# Patient Record
Sex: FEMALE | Race: BLACK OR AFRICAN AMERICAN | NOT HISPANIC OR LATINO | Employment: FULL TIME | URBAN - METROPOLITAN AREA
[De-identification: names, ages, dates, MRNs, and addresses within clinical notes are randomized per-mention and may not be internally consistent; named-entity substitution may affect disease eponyms.]

---

## 2024-08-12 ENCOUNTER — HOSPITAL ENCOUNTER (EMERGENCY)
Facility: HOSPITAL | Age: 29
Discharge: HOME | End: 2024-08-12
Attending: EMERGENCY MEDICINE
Payer: MEDICAID

## 2024-08-12 VITALS
HEART RATE: 80 BPM | BODY MASS INDEX: 46.33 KG/M2 | TEMPERATURE: 97 F | OXYGEN SATURATION: 96 % | WEIGHT: 236 LBS | DIASTOLIC BLOOD PRESSURE: 116 MMHG | SYSTOLIC BLOOD PRESSURE: 232 MMHG | RESPIRATION RATE: 20 BRPM | HEIGHT: 60 IN

## 2024-08-12 DIAGNOSIS — D50.9 MICROCYTIC ANEMIA: ICD-10-CM

## 2024-08-12 DIAGNOSIS — R10.13 EPIGASTRIC PAIN: ICD-10-CM

## 2024-08-12 DIAGNOSIS — K62.5 RECTAL BLEEDING: Primary | ICD-10-CM

## 2024-08-12 LAB
ABO + RH BLD: NORMAL
ALBUMIN SERPL BCP-MCNC: 3.7 G/DL (ref 3.5–5.2)
ALP SERPL-CCNC: 72 U/L (ref 55–135)
ALT SERPL W/O P-5'-P-CCNC: 8 U/L (ref 10–44)
ANION GAP SERPL CALC-SCNC: 9 MMOL/L (ref 8–16)
APTT PPP: 30 SEC (ref 21–32)
AST SERPL-CCNC: 12 U/L (ref 10–40)
B-HCG UR QL: NEGATIVE
BASOPHILS # BLD AUTO: 0.02 K/UL (ref 0–0.2)
BASOPHILS NFR BLD: 0.4 % (ref 0–1.9)
BILIRUB SERPL-MCNC: 0.1 MG/DL (ref 0.1–1)
BILIRUB UR QL STRIP: NEGATIVE
BLD GP AB SCN CELLS X3 SERPL QL: NORMAL
BUN SERPL-MCNC: 8 MG/DL (ref 6–20)
CALCIUM SERPL-MCNC: 9 MG/DL (ref 8.7–10.5)
CHLORIDE SERPL-SCNC: 107 MMOL/L (ref 95–110)
CLARITY UR: CLEAR
CO2 SERPL-SCNC: 24 MMOL/L (ref 23–29)
COLOR UR: YELLOW
CREAT SERPL-MCNC: 0.8 MG/DL (ref 0.5–1.4)
DIFFERENTIAL METHOD BLD: ABNORMAL
EOSINOPHIL # BLD AUTO: 0.1 K/UL (ref 0–0.5)
EOSINOPHIL NFR BLD: 1.5 % (ref 0–8)
ERYTHROCYTE [DISTWIDTH] IN BLOOD BY AUTOMATED COUNT: 17.1 % (ref 11.5–14.5)
EST. GFR  (NO RACE VARIABLE): >60 ML/MIN/1.73 M^2
GLUCOSE SERPL-MCNC: 103 MG/DL (ref 70–110)
GLUCOSE UR QL STRIP: NEGATIVE
HCT VFR BLD AUTO: 31.2 % (ref 37–48.5)
HGB BLD-MCNC: 9.4 G/DL (ref 12–16)
HGB UR QL STRIP: ABNORMAL
IMM GRANULOCYTES # BLD AUTO: 0.01 K/UL (ref 0–0.04)
IMM GRANULOCYTES NFR BLD AUTO: 0.2 % (ref 0–0.5)
INR PPP: 1 (ref 0.8–1.2)
KETONES UR QL STRIP: NEGATIVE
LEUKOCYTE ESTERASE UR QL STRIP: NEGATIVE
LIPASE SERPL-CCNC: 46 U/L (ref 4–60)
LYMPHOCYTES # BLD AUTO: 2.7 K/UL (ref 1–4.8)
LYMPHOCYTES NFR BLD: 49.7 % (ref 18–48)
MAGNESIUM SERPL-MCNC: 1.8 MG/DL (ref 1.6–2.6)
MCH RBC QN AUTO: 22 PG (ref 27–31)
MCHC RBC AUTO-ENTMCNC: 30.1 G/DL (ref 32–36)
MCV RBC AUTO: 73 FL (ref 82–98)
MICROSCOPIC COMMENT: NORMAL
MONOCYTES # BLD AUTO: 0.6 K/UL (ref 0.3–1)
MONOCYTES NFR BLD: 10.2 % (ref 4–15)
NEUTROPHILS # BLD AUTO: 2.1 K/UL (ref 1.8–7.7)
NEUTROPHILS NFR BLD: 38 % (ref 38–73)
NITRITE UR QL STRIP: NEGATIVE
NRBC BLD-RTO: 0 /100 WBC
PH UR STRIP: 6 [PH] (ref 5–8)
PLATELET # BLD AUTO: 370 K/UL (ref 150–450)
PMV BLD AUTO: 9.9 FL (ref 9.2–12.9)
POTASSIUM SERPL-SCNC: 3.6 MMOL/L (ref 3.5–5.1)
PROT SERPL-MCNC: 7.9 G/DL (ref 6–8.4)
PROT UR QL STRIP: ABNORMAL
PROTHROMBIN TIME: 10.5 SEC (ref 9–12.5)
RBC # BLD AUTO: 4.27 M/UL (ref 4–5.4)
RBC #/AREA URNS HPF: 0 /HPF (ref 0–4)
SODIUM SERPL-SCNC: 140 MMOL/L (ref 136–145)
SP GR UR STRIP: >1.03 (ref 1–1.03)
SPECIMEN OUTDATE: NORMAL
URN SPEC COLLECT METH UR: ABNORMAL
UROBILINOGEN UR STRIP-ACNC: ABNORMAL EU/DL
WBC # BLD AUTO: 5.41 K/UL (ref 3.9–12.7)

## 2024-08-12 PROCEDURE — 85025 COMPLETE CBC W/AUTO DIFF WBC: CPT

## 2024-08-12 PROCEDURE — 86900 BLOOD TYPING SEROLOGIC ABO: CPT | Performed by: EMERGENCY MEDICINE

## 2024-08-12 PROCEDURE — 25500020 PHARM REV CODE 255: Performed by: EMERGENCY MEDICINE

## 2024-08-12 PROCEDURE — 86901 BLOOD TYPING SEROLOGIC RH(D): CPT | Performed by: EMERGENCY MEDICINE

## 2024-08-12 PROCEDURE — 85730 THROMBOPLASTIN TIME PARTIAL: CPT | Performed by: EMERGENCY MEDICINE

## 2024-08-12 PROCEDURE — 81000 URINALYSIS NONAUTO W/SCOPE: CPT

## 2024-08-12 PROCEDURE — 99285 EMERGENCY DEPT VISIT HI MDM: CPT | Mod: 25

## 2024-08-12 PROCEDURE — 86850 RBC ANTIBODY SCREEN: CPT | Performed by: EMERGENCY MEDICINE

## 2024-08-12 PROCEDURE — 83735 ASSAY OF MAGNESIUM: CPT

## 2024-08-12 PROCEDURE — 85610 PROTHROMBIN TIME: CPT | Performed by: EMERGENCY MEDICINE

## 2024-08-12 PROCEDURE — 93005 ELECTROCARDIOGRAM TRACING: CPT

## 2024-08-12 PROCEDURE — 80053 COMPREHEN METABOLIC PANEL: CPT

## 2024-08-12 PROCEDURE — 81025 URINE PREGNANCY TEST: CPT

## 2024-08-12 PROCEDURE — 93010 ELECTROCARDIOGRAM REPORT: CPT | Mod: ,,, | Performed by: INTERNAL MEDICINE

## 2024-08-12 PROCEDURE — 83690 ASSAY OF LIPASE: CPT | Performed by: EMERGENCY MEDICINE

## 2024-08-12 RX ORDER — KETOROLAC TROMETHAMINE 30 MG/ML
15 INJECTION, SOLUTION INTRAMUSCULAR; INTRAVENOUS
Status: DISCONTINUED | OUTPATIENT
Start: 2024-08-12 | End: 2024-08-12

## 2024-08-12 RX ADMIN — IOHEXOL 130 ML: 350 INJECTION, SOLUTION INTRAVENOUS at 10:08

## 2024-08-13 LAB
OHS QRS DURATION: 86 MS
OHS QTC CALCULATION: 449 MS

## 2024-08-13 NOTE — ED TRIAGE NOTES
"30 yo female reports to ed with co blood in stool. States this has happened before but has become more consistent. Reports seeing blood clots in stool. States her ABD "spasms like a thien horse" and changes from one side to another. States symptoms come and go. Also reports intermittent "stabbing" sensation in legs periodically. Denies blood thinner use, does not take any daily medications.   "

## 2024-08-13 NOTE — ED NOTES
"Pt states she reported to the ER because she was experiencing "blood in stools" and pain to her lower abd and groin. Pt states she  does not feel pain at this moment but when touched she feels "pressure" Pt states she has been experiencing these symptoms for about 3 days now. Redig and pillow provided for comfort. Education provided. Safety measures intact. Side rails x2. Call bell in reach.   "

## 2024-08-13 NOTE — ED NOTES
Pt AAOx3 siting upright in bed on mobile device. Respirations even and unlabored. No signs of distress. Pt denies needs at this time. Side rails x2. Bed in lowest position. Call bell in reach. Education provided. Safety measures intact.

## 2024-08-13 NOTE — ED PROVIDER NOTES
Encounter Date: 8/12/2024       History     Chief Complaint   Patient presents with    Groin Pain     PT to right groin pain with intermittent spasm type pain to RUQ ABD. PT endorses dark blood in stools with clots on last Friday and dark tarry stools since.      Patient presents with multiple complaints.  She has complaints of intermittent pain to the right groin area along with intermittent pain to the bilateral upper quadrant area for the past few days.  She is also complaining of hematochezia with a clots.  Denies any NSAID use, denies any fevers/chills.  States nothing exacerbates or relieves the issues.  States that she is currently not having groin pain.    The history is provided by the patient.     Review of patient's allergies indicates:  No Known Allergies  History reviewed. No pertinent past medical history.  No past surgical history on file.  No family history on file.  Social History     Tobacco Use    Smoking status: Former    Smokeless tobacco: Never   Substance Use Topics    Alcohol use: No    Drug use: Never     Review of Systems   Gastrointestinal:  Positive for abdominal pain and blood in stool.       Physical Exam     Initial Vitals [08/12/24 1927]   BP Pulse Resp Temp SpO2   (!) 232/116 80 20 97.1 °F (36.2 °C) 96 %      MAP       --         Physical Exam    Vitals reviewed.  Constitutional: She appears well-developed. No distress.   Abdominal: Abdomen is soft.   Right lower quadrant tenderness to palpation, rectal exam shows brown stool with no signs of blood   Musculoskeletal:         General: Normal range of motion.     Neurological: She is alert and oriented to person, place, and time.   Skin: Skin is warm and dry. No rash noted.         ED Course   Procedures  Labs Reviewed   CBC W/ AUTO DIFFERENTIAL - Abnormal       Result Value    WBC 5.41      RBC 4.27      Hemoglobin 9.4 (*)     Hematocrit 31.2 (*)     MCV 73 (*)     MCH 22.0 (*)     MCHC 30.1 (*)     RDW 17.1 (*)     Platelets 370       MPV 9.9      Immature Granulocytes 0.2      Gran # (ANC) 2.1      Immature Grans (Abs) 0.01      Lymph # 2.7      Mono # 0.6      Eos # 0.1      Baso # 0.02      nRBC 0      Gran % 38.0      Lymph % 49.7 (*)     Mono % 10.2      Eosinophil % 1.5      Basophil % 0.4      Differential Method Automated     COMPREHENSIVE METABOLIC PANEL - Abnormal    Sodium 140      Potassium 3.6      Chloride 107      CO2 24      Glucose 103      BUN 8      Creatinine 0.8      Calcium 9.0      Total Protein 7.9      Albumin 3.7      Total Bilirubin 0.1      Alkaline Phosphatase 72      AST 12      ALT 8 (*)     eGFR >60      Anion Gap 9     URINALYSIS, REFLEX TO URINE CULTURE - Abnormal    Specimen UA Urine, Clean Catch      Color, UA Yellow      Appearance, UA Clear      pH, UA 6.0      Specific Gravity, UA >1.030 (*)     Protein, UA Trace (*)     Glucose, UA Negative      Ketones, UA Negative      Bilirubin (UA) Negative      Occult Blood UA 3+ (*)     Nitrite, UA Negative      Urobilinogen, UA 2.0-3.0 (*)     Leukocytes, UA Negative      Narrative:     Specimen Source->Urine   MAGNESIUM    Magnesium 1.8     URINALYSIS MICROSCOPIC    RBC, UA 0      Microscopic Comment SEE COMMENT      Narrative:     Specimen Source->Urine   PREGNANCY TEST, URINE RAPID   APTT    aPTT 30.0     PROTIME-INR    Prothrombin Time 10.5      INR 1.0     PREGNANCY TEST, URINE RAPID    Preg Test, Ur Negative      Narrative:     Specimen Source->Urine   LIPASE    Lipase 46     TYPE & SCREEN    Group & Rh O POS      Indirect [place] NEG      Specimen Outdate 08/15/2024 23:59       EKG Readings: (Independently Interpreted)   Sinus rhythm rate of 73, normal axis, no ST or T-wave abnormalities noted     ECG Results              EKG 12-lead (Final result)        Collection Time Result Time QRS Duration OHS QTC Calculation    08/12/24 21:17:25 08/13/24 17:14:44 86 449                     Final result by Interface, Lab In [facility] (08/13/24 17:14:48)                    Narrative:    Test Reason : R10.13,    Vent. Rate : 073 BPM     Atrial Rate : 073 BPM     P-R Int : 126 ms          QRS Dur : 086 ms      QT Int : 408 ms       P-R-T Axes : 013 057 021 degrees     QTc Int : 449 ms    Normal sinus rhythm  Normal ECG  No previous ECGs available  Confirmed by Prabhakar Waters MD ([id number]) on 8/13/2024 5:14:42 PM    Referred By: [email or web address]   SELF           Confirmed By:Prabhakar Waters MD                                  Imaging Results              CTA Acute GI [place], Abdomen and Pelvis (Final result)  Result time 08/12/24 23:07:35      Final result by Dhaval Mathews MD (08/12/24 23:07:35)                   Impression:      No extravasation of contrast to suggest acute GI bleed at the time of the examination.    Normal appendix.    Trace free fluid in the pelvis, nonspecific, possibly physiological.    Additional incidental findings as above.      Electronically signed by: Dhaval Mathews MD  Date:    08/12/2024  Time:    23:07               Narrative:    EXAMINATION:  CTA ACUTE GI BLEED, ABDOMEN AND PELVIS    CLINICAL HISTORY:  rectal bleeding and RLQ and R pelvic pain;    TECHNIQUE:  Initial noncontrast  images were obtained of the abdomen and pelvis.  CT axial angiography images were then obtained from the lung bases to the pubic symphysis following the intravenous administration of 130 of Omnipaque 350with delayed images obtained per GI bleeding protocol.  Sagittal and coronal reformats were provided.    COMPARISON:  None    FINDINGS:  Heart: Normal in size.  No pericardial effusion.    Lung bases: Symmetrically expanded.  No consolidation, pleural effusion, mass, or pneumothorax.    Liver: Normal in size and attenuation without focal hepatic abnormality.    Gallbladder: Normal in appearance without evidence for cholecystitis.    Bile Ducts: No intra or extrahepatic biliary ductal dilation.    Pancreas: No pancreatic mass lesion or peripancreatic inflammatory  change.    Spleen: Unremarkable.    Adrenals: Unremarkable.    Kidneys/ Ureters: Normal in size and location.  Kidneys enhance and excrete normally.  No focal renal abnormality or nephrolithiasis.  No hydroureteronephrosis.    Bladder: Smooth contours without bladder wall thickening.    Reproductive organs: Unremarkable.    GI Tract/Mesentery: The stomach is unremarkable.  Visualized loops of small and large bowel are normal in caliber without evidence for obstruction or inflammation. Normal appendix.    Peritoneal Space: No intraperitoneal free air or significant adenopathy.  Small volume free fluid in the pelvis.    Retroperitoneum: No significant adenopathy.    Abdominal wall/extraperitoneal soft tissues: Small fat containing umbilical hernia.  Small fat containing infraumbilical ventral abdominal wall hernia just to the right of midline.    Vasculature: Abdominal aorta is normal in caliber, contour, and course without calcific atherosclerosis.  Major branch vessels are patent.  No extravasation of contrast to suggest acute GI bleed at time of scan.    Bones: Degenerative changes without acute fracture or bone destructive process.                                       Medications   iohexoL (OMNIPAQUE 350) injection 130 mL (130 mLs Intravenous Given 8/12/24 2235)     Medical Decision Making  Patient tolerating fluids at the time of discharge.  GI referral placed.  Instructed to return immediately for any new or worsening symptoms and she verbalized understanding.    Amount and/or Complexity of Data Reviewed  Labs: ordered. Decision-making details documented in ED Course.  Radiology: ordered. Decision-making details documented in ED Course.    Risk  Prescription drug management.  Risk Details: Differential diagnosis includes but is not limited to:  Appendicitis, ovarian cyst, ovarian torsion, GI bleed               ED Course as of 08/14/24 1003   Mon Aug 12, 2024   1933 BP(!): 232/116  Patient states that she has  high blood pressure but does not take her medication. [BJ]   2103 Comprehensive metabolic panel(!)  Nonspecific findings [CD]   2103 Magnesium  wnl [CD]   2104 Pregnancy, urine rapid  Negative [CD]   2221 Lipase  Within normal limits [CD]   2221 Protime-INR  Within normal limits [CD]   2221 APTT  Within normal limits [CD]   2316 CTA Acute GI [place], Abdomen and Pelvis  No extravasation of contrast to suggest acute GI bleed at the time of the examination.     Normal appendix.     Trace free fluid in the pelvis, nonspecific, possibly physiological.   [CD]      ED Course User Index  [BJ] Jaqueline Oh PA-C  [CD] Fredi Burt,                            Clinical Impression:  Final diagnoses:  [R10.13] Epigastric pain  [K62.5] Rectal bleeding (Primary)  [D50.9] Microcytic anemia          ED Disposition Condition    Discharge Stable          ED Prescriptions    None       Follow-up Information       Follow up With Specialties Details Why Contact Info Additional Information    [facility]   [address], [address]  [facility] [address] [phone] Please park in [address] or D and use [address] entrance. Take Medical Office Bldg. elevators.    Akira Browning MD Gastroenterology Schedule an appointment as soon as possible for a visit   [address] [place] [address]  [address] [place] [address] [id number] [phone]                Fredi Burt,   08/14/24 8202

## 2024-08-14 ENCOUNTER — TELEPHONE (OUTPATIENT)
Dept: HEMATOLOGY/ONCOLOGY | Facility: CLINIC | Age: 29
End: 2024-08-14
Payer: MEDICAID

## 2024-08-14 ENCOUNTER — OFFICE VISIT (OUTPATIENT)
Dept: GASTROENTEROLOGY | Facility: CLINIC | Age: 29
End: 2024-08-14
Payer: MEDICAID

## 2024-08-14 VITALS
BODY MASS INDEX: 46.92 KG/M2 | SYSTOLIC BLOOD PRESSURE: 154 MMHG | HEART RATE: 86 BPM | HEIGHT: 60 IN | WEIGHT: 239 LBS | DIASTOLIC BLOOD PRESSURE: 103 MMHG

## 2024-08-14 DIAGNOSIS — E66.01 CLASS 3 SEVERE OBESITY DUE TO EXCESS CALORIES WITH BODY MASS INDEX (BMI) OF 45.0 TO 49.9 IN ADULT, UNSPECIFIED WHETHER SERIOUS COMORBIDITY PRESENT: ICD-10-CM

## 2024-08-14 DIAGNOSIS — D50.9 MICROCYTIC ANEMIA: ICD-10-CM

## 2024-08-14 DIAGNOSIS — K59.04 CHRONIC IDIOPATHIC CONSTIPATION: Primary | ICD-10-CM

## 2024-08-14 DIAGNOSIS — R10.30 LOWER ABDOMINAL PAIN: ICD-10-CM

## 2024-08-14 DIAGNOSIS — K62.5 RECTAL BLEEDING: ICD-10-CM

## 2024-08-14 DIAGNOSIS — N92.0 MENORRHAGIA WITH REGULAR CYCLE: ICD-10-CM

## 2024-08-14 DIAGNOSIS — D50.0 IRON DEFICIENCY ANEMIA DUE TO CHRONIC BLOOD LOSS: Primary | ICD-10-CM

## 2024-08-14 PROBLEM — E66.813 CLASS 3 SEVERE OBESITY DUE TO EXCESS CALORIES WITH BODY MASS INDEX (BMI) OF 45.0 TO 49.9 IN ADULT: Status: ACTIVE | Noted: 2024-08-14

## 2024-08-14 PROCEDURE — 4010F ACE/ARB THERAPY RXD/TAKEN: CPT | Mod: CPTII,,, | Performed by: NURSE PRACTITIONER

## 2024-08-14 PROCEDURE — 99204 OFFICE O/P NEW MOD 45 MIN: CPT | Mod: S$PBB,,, | Performed by: NURSE PRACTITIONER

## 2024-08-14 PROCEDURE — 99215 OFFICE O/P EST HI 40 MIN: CPT | Mod: PBBFAC,PN | Performed by: NURSE PRACTITIONER

## 2024-08-14 PROCEDURE — 1160F RVW MEDS BY RX/DR IN RCRD: CPT | Mod: CPTII,,, | Performed by: NURSE PRACTITIONER

## 2024-08-14 PROCEDURE — 3008F BODY MASS INDEX DOCD: CPT | Mod: CPTII,,, | Performed by: NURSE PRACTITIONER

## 2024-08-14 PROCEDURE — 3080F DIAST BP >= 90 MM HG: CPT | Mod: CPTII,,, | Performed by: NURSE PRACTITIONER

## 2024-08-14 PROCEDURE — 99999 PR PBB SHADOW E&M-EST. PATIENT-LVL V: CPT | Mod: PBBFAC,,, | Performed by: NURSE PRACTITIONER

## 2024-08-14 PROCEDURE — 3077F SYST BP >= 140 MM HG: CPT | Mod: CPTII,,, | Performed by: NURSE PRACTITIONER

## 2024-08-14 PROCEDURE — 1159F MED LIST DOCD IN RCRD: CPT | Mod: CPTII,,, | Performed by: NURSE PRACTITIONER

## 2024-08-14 RX ORDER — DICYCLOMINE HYDROCHLORIDE 20 MG/1
20 TABLET ORAL 3 TIMES DAILY PRN
Qty: 60 TABLET | Refills: 2 | Status: SHIPPED | OUTPATIENT
Start: 2024-08-14

## 2024-08-14 RX ORDER — LISINOPRIL 10 MG/1
10 TABLET ORAL
COMMUNITY
Start: 2024-08-13

## 2024-08-14 RX ORDER — SODIUM, POTASSIUM,MAG SULFATES 17.5-3.13G
1 SOLUTION, RECONSTITUTED, ORAL ORAL DAILY
Qty: 1 KIT | Refills: 0 | Status: SHIPPED | OUTPATIENT
Start: 2024-08-14 | End: 2024-08-16

## 2024-08-14 RX ORDER — FERROUS SULFATE 325(65) MG
325 TABLET ORAL 2 TIMES DAILY
Qty: 60 TABLET | Refills: 2 | Status: SHIPPED | OUTPATIENT
Start: 2024-08-14

## 2024-08-14 RX ORDER — POLYETHYLENE GLYCOL 3350 17 G/17G
17 POWDER, FOR SOLUTION ORAL DAILY
Qty: 510 G | Refills: 11 | Status: SHIPPED | OUTPATIENT
Start: 2024-08-14

## 2024-08-14 NOTE — PROGRESS NOTES
Subjective:       Patient ID: Mariana Hardy is a 29 y.o. female.    Chief Complaint: Abdominal Pain, Rectal Bleeding, and Constipation    28 y/o female with HTN presents to clinic for hospital follow up with c/o abdominal pain and rectal bleeding. Patient was seen in ER 8/12 with aforementioned symptoms. CTA negative for acute GIB. Patient reports frequent rectal bleeding with BM. She is constipated and endorses straining to pass stool. Has BM 2-3x/week. Has never tried any stool softeners or laxatives. Intermittent lower abdominal sharp pains and cramping. Not related to food intake. Some relief with BM. No FH colon cancer. Recent labs consistent with microcytic anemia. Patient denies hx of MARCY. Does report heavy bleeding with menses. Has not seen GYN in years. She reports fatigue. No shortness of breath, chest pain, or dizziness.         History reviewed. No pertinent past medical history.    History reviewed. No pertinent surgical history.    Family History   Problem Relation Name Age of Onset    Thyroid disease Mother         Social History     Socioeconomic History    Marital status: Single   Tobacco Use    Smoking status: Former    Smokeless tobacco: Never   Substance and Sexual Activity    Alcohol use: No    Drug use: Never    Sexual activity: Not Currently       Review of Systems   Constitutional:  Positive for fatigue. Negative for appetite change and unexpected weight change.   HENT:  Negative for trouble swallowing.    Respiratory:  Negative for shortness of breath.    Cardiovascular:  Negative for chest pain.   Gastrointestinal:  Positive for abdominal pain, blood in stool and constipation.   Hematological:  Negative for adenopathy. Does not bruise/bleed easily.   Psychiatric/Behavioral:  Negative for dysphoric mood.          Objective:     Vitals:    08/14/24 1013   BP: (!) 154/103   BP Location: Left arm   Patient Position: Sitting   BP Method: Medium (Automatic)   Pulse: 86   Weight: 108.4 kg (238  lb 15.7 oz)   Height: 5' (1.524 m)          Physical Exam  Constitutional:       General: She is not in acute distress.     Appearance: She is obese.   HENT:      Head: Normocephalic.   Eyes:      Conjunctiva/sclera: Conjunctivae normal.   Pulmonary:      Effort: Pulmonary effort is normal. No respiratory distress.   Musculoskeletal:         General: Normal range of motion.      Cervical back: Normal range of motion.   Skin:     General: Skin is warm and dry.   Neurological:      Mental Status: She is alert and oriented to person, place, and time.   Psychiatric:         Mood and Affect: Mood normal.         Behavior: Behavior normal.               Assessment:         ICD-10-CM ICD-9-CM   1. Lower abdominal pain  R10.30 789.09   2. Chronic idiopathic constipation  K59.04 564.00   3. Rectal bleeding  K62.5 569.3   4. Microcytic anemia  D50.9 280.9   5. Menorrhagia with regular cycle  N92.0 626.2   6. Class 3 severe obesity due to excess calories with body mass index (BMI) of 45.0 to 49.9 in adult, unspecified whether serious comorbidity present  E66.01 278.01    Z68.42 V85.42       Plan:       Chronic idiopathic constipation  -     polyethylene glycol (GLYCOLAX) 17 gram/dose powder; Take 17 g by mouth once daily.  Dispense: 510 g; Refill: 11    Rectal bleeding  -     Ambulatory referral/consult to Gastroenterology  -     sodium,potassium,mag sulfates (SUPREP BOWEL PREP KIT) 17.5-3.13-1.6 gram SolR; Take 177 mLs by mouth once daily. for 2 days  Dispense: 1 kit; Refill: 0  -     Case Request Endoscopy: COLONOSCOPY    Lower abdominal pain  -     Ambulatory referral/consult to Gastroenterology  -     dicyclomine (BENTYL) 20 mg tablet; Take 1 tablet (20 mg total) by mouth 3 (three) times daily as needed (abdominal  pain).  Dispense: 60 tablet; Refill: 2    Microcytic anemia  -     Iron and TIBC; Future; Expected date: 08/14/2024  -     Ferritin; Future; Expected date: 08/14/2024  -     Ambulatory referral/consult to  Hematology / Oncology; Future; Expected date: 08/21/2024  -     sodium,potassium,mag sulfates (SUPREP BOWEL PREP KIT) 17.5-3.13-1.6 gram SolR; Take 177 mLs by mouth once daily. for 2 days  Dispense: 1 kit; Refill: 0  -     Case Request Endoscopy: COLONOSCOPY    Menorrhagia with regular cycle  -     Ambulatory referral/consult to Obstetrics / Gynecology; Future; Expected date: 08/21/2024    Class 3 severe obesity due to excess calories with body mass index (BMI) of 45.0 to 49.9 in adult, unspecified whether serious comorbidity present        -    Weight loss advised. Dietary and         exercise counseling done.      Follow up if symptoms worsen or fail to improve.     Patient's Medications   New Prescriptions    DICYCLOMINE (BENTYL) 20 MG TABLET    Take 1 tablet (20 mg total) by mouth 3 (three) times daily as needed (abdominal  pain).    POLYETHYLENE GLYCOL (GLYCOLAX) 17 GRAM/DOSE POWDER    Take 17 g by mouth once daily.    SODIUM,POTASSIUM,MAG SULFATES (SUPREP BOWEL PREP KIT) 17.5-3.13-1.6 GRAM SOLR    Take 177 mLs by mouth once daily. for 2 days   Previous Medications    IBUPROFEN (ADVIL,MOTRIN) 600 MG TABLET    Take 1 tablet (600 mg total) by mouth every 6 (six) hours as needed for Pain.    IBUPROFEN (ADVIL,MOTRIN) 800 MG TABLET    Take 1 tablet (800 mg total) by mouth every 6 (six) hours as needed for Pain.    LISINOPRIL 10 MG TABLET    Take 10 mg by mouth.   Modified Medications    No medications on file   Discontinued Medications    No medications on file

## 2024-08-14 NOTE — PATIENT INSTRUCTIONS
For constipation, start Miralax 1 capful in 6-8 ounces of water or juice daily (I will send prescription to your pharmacy) and an over the counter fiber supplement (such as Fiber gummy or Metamucil capsules once daily).           SUPREP Instructions    [facility] [address] [place]  [address]    You are scheduled for a colonoscopy with Dr. Haider on 9/3/2024 at [facility]. You will enter through the South entrance and check in at Same Day Surgery.  To ensure that your test is accurate and complete, you MUST follow these instructions listed below.  If you have any questions, please call our office at [phone].  Plan on being at the hospital for your procedure for 3-4 hours.    1.  Follow a CLEAR LIQUID DIET for the entire day before your scheduled colonoscopy.  This means no solid food the entire day starting when you wake.  You may have as much of the clear liquids as you want throughout the day.   CLEAR LIQUID DIET:   - NO DAIRY   - You can have:  Coffee with sugar (no creamer), tea, water, soda, apple or white grape juice, chicken or beef broth/bouillon (no meat, noodles, or veggies), popsicles, Jell-O, lemonade.    At 12 noon take 2 dulcolax tablets    2.  AT 5 pm the evening before your colonoscopy, POUR ONE (1) BOTTLE OF SUPREP INTO THE MIXING CONTAINER, PROVIDED INSIDE THE BOX.  ADD WATER TO THE LINE ON THE CONTAINER AND MIX IT WELL.  DRINK THE ENTIRE CONTAINER AND THEN DRINK TWO (2) MORE CONTAINERS OF WATER OVER THE NEXT 1 HOUR.  This is sometimes easier to drink if this solution is cold, so you can mix the solution 20 minutes ahead of time and place in the refrigerator prior to drinking.  You have to drink the solution within 30-45 minutes of mixing it.  Do NOT put this solution over ice.  It IS ok to drink with a straw.    3.  The endoscopy department will call you 1 day before your colonoscopy to tell you the exact time to arrive, AND to tell you the  exact time to drink the 2nd portion of your prep (which will be FIVE HOURS BEFORE YOUR ARRIVAL TIME).  At this time given to you, POUR ONE (1) BOTTLE OF SUPREP INTO THE MIXING CONTAINER, PROVIDED INSIDE THE BOX.  ADD WATER TO THE LINE ON THE CONTAINER AND MIX IT WELL.  DRINK THE ENTIRE CONTAINER AND THEN DRINK TWO (2) MORE CONTAINERS OF WATER OVER THE NEXT 1 HOUR.  This is sometimes easier to drink if this solution is cold, so you can mix the solution 20 minutes ahead of time and place in the refrigerator prior to drinking.  You have to drink the solution within 30-45 minutes of mixing it.  Do NOT put this solution over ice.  It IS ok to drink with a straw.  Once this is complete, you may not have ANYTHING else by mouth!    4.  You must have someone with you to DRIVE YOU HOME since you will be receiving IV sedation for the colonoscopy.    5.  It is ok to take MOST of your REGULAR MEDICATIONS  in the morning of your test with a SIP of water.  THE ONLY MEDS YOU NEED TO HOLD ARE YOUR DIABETES MEDICATIONS,  SOME BLOOD PRESSURE MEDS, AND BLOOD THINNERS IF OK'D BY YOUR DOCTOR.  Do NOT have anything else to eat or drink the morning of your colonoscopy.  It is ok to brush your teeth.    6.  If you are on blood thinners THAT YOU HAVE BEEN INSTRUCTED TO HOLD BY YOUR DOCTOR FOR THIS PROCEDURE, then do NOT take this the morning of your colonoscopy.  Do NOT stop these medications on your own, they must be approved to be held by your doctor.  Your colonoscopy can NOT be done if you are on these medications.  Examples of blood thinners include: Coumadin, Aggrenox, Plavix, Pradaxa, Reapro, Pletal, Xarelto, Ticagrelor, Brilinta, Eliquis, and high dose aspirin (325 mg).  You do not have to stop baby aspirin 81 mg.    7.  IF YOU ARE DIABETIC:  NO INSULIN OR ORAL MEDICATIONS THE MORNING OF THE COLONOSCOPY.  TAKE ONLY HALF THE DOSE OF YOUR INSULIN THE DAY BEFORE THE COLONOSCOPY.  DO NOT TAKE ANY ORAL DIABETIC MEDICATIONS THE DAY BEFORE  THE COLONOSCOPY.  IF YOU ARE AN INSULIN DEPENDENT DIABETIC WITH UNSTABLE BLOOD SUGARS, NOTIFY YOUR PRIMARY CARE PHYSICIAN FOR INSTRUCTIONS.    You will receive a call the afternoon before your colonoscopy to tell you the time to arrive.  If you have not received a call by the day before your procedure, call the Pre-op Coordinator at [phone].

## 2024-08-14 NOTE — LETTER
August 14, 2024      [facility] - Gastroenterology  [address]  [address]  [place] [address]  Phone: [phone]  Fax: [phone]       Patient: Mariana Hardy   YOB: 1995  Date of Visit: 08/14/2024    To Whom It May Concern:    Parish Hardy  was at [facility] on 08/14/2024. The patient may return to work/school on 8/15/2024 with no restrictions. If you have any questions or concerns, or if I can be of further assistance, please do not hesitate to contact me.    Sincerely,    LAILA Calderón

## 2024-08-23 ENCOUNTER — TELEPHONE (OUTPATIENT)
Dept: ENDOSCOPY | Facility: HOSPITAL | Age: 29
End: 2024-08-23
Payer: MEDICAID

## 2024-08-23 NOTE — TELEPHONE ENCOUNTER
Spoke to pt for pre-call to confirm scheduled Colonoscopy and patient verbalized understanding of the following:       Date of Procedure (s)  verified 9/3/24  Arrival Time 11:30 AM verified.  Location of Procedure(s) [address]  verified.  NPO status reinforced. Ok to continue clear liquids up until 4 hours prior to the Endoscopy procedure.   Denies blood thinners and GLP-1s.  Pt confirmed receipt of prep instructions and Rx prep (if applicable).  [facility]  Pt confirmed ride home after procedure if procedure requires anesthesia.   Pre-call screening questionnaire reviewed and completed with patient.   Appointment details are tentative, including check-in time.  If the patient begins taking any blood thinning medications, injectable weight loss/diabetes medications (other than insulin), or Adipex (phentermine) patient was instructed to contact the endoscopy scheduling department as soon as possible.  Patient was advised to call the endoscopy scheduling department if any questions or concerns arise.       [facility] Endoscopy Scheduling Department

## 2024-08-24 ENCOUNTER — NURSE TRIAGE (OUTPATIENT)
Dept: ADMINISTRATIVE | Facility: CLINIC | Age: 29
End: 2024-08-24
Payer: MEDICAID

## 2024-08-24 NOTE — TELEPHONE ENCOUNTER
Pt scheduled for colonoscopy on 9/3. Pt wondering if she can use her juicer or smoothie maker for the day of the prep. Dispo is for home care. Told patient she would not be able to use juicer or smoothie maker for clear liquids because the smoothies were too thick and the juicer would include the pulp of the juice. Utilized patient instructions found on [employer] message and reviewed approved liquids. Instructed patient on how to get to instructions on patient portal. Pt v/u.     Pt also reports that her iron medication she was prescribed gave her severe N/V after taking. Pt stopped taking the medication and wants further instructions on how to take. Utilized [employer] website and read to patient-  Swallow whole. Do not chew, break, or crush.  Take with a full glass of water.This drug works better if you take it on an empty stomach. If this drug causes an upset stomach, talk with your doctor about the best way to take this drug with food.    Dispo is to call PCP when office is open. Will send high priority message to gastro doctor to follow-up with patient. Pt v/u.        Reason for Disposition   Bowel prep for colonoscopy, questions about   Colonoscopy, questions about   [1] Caller has NON-URGENT medicine question about med that PCP prescribed AND [2] triager unable to answer question    Additional Information   Negative: Shock suspected (e.g., cold/pale/clammy skin, too weak to stand, low BP, rapid pulse)   Negative: Difficult to awaken or acting confused (e.g., disoriented, slurred speech)   Negative: Passed out (e.g., fainted, lost consciousness, blacked out and was not responding)   Negative: Sounds like a life-threatening emergency to the triager   Negative: SEVERE abdomen pain (e.g., excruciating)   Negative: SEVERE rectal bleeding (large blood clots; on and off, or constant bleeding)   Negative: SEVERE dizziness (e.g., unable to stand, requires support to walk, feels like passing out now)   Negative: SEVERE  vomiting (e.g., 6 or more times/day)   Negative: [1] MODERATE rectal bleeding (small blood clots, passing blood without stool, or toilet water turns red) AND [2] more than once   Negative: [1] MILD-MODERATE abdomen pain AND [2] constant AND [3] present > 2 hours   Negative: [1] Drinking very little AND [2] dehydration suspected (e.g., no urine > 12 hours, very dry mouth, very lightheaded)   Negative: Patient sounds very sick or weak to the triager   Negative: Fever > 100.4 F (38.0 C)   Negative: [1] Abdominal bloating, cramping, nausea, or vomiting while drinking bowel prep AND [2] CANNOT finish bowel prep AND [3] has tried recommended Care Advice   Negative: [1] Caller has URGENT question or concern AND [2] triager unable to answer question   Negative: [1] MILD-MODERATE abdomen pain (e.g., does not interfere with normal activities) AND [2] pain comes and goes (cramps) [3] lasting > 48 hours   Negative: [1] MILD to MODERATE vomiting (e.g., 1 to 5 times a day) AND [2] lasting > 24 hours   Negative: Any rectal bleeding occurring > 24 hours after colonoscopy   Negative: [1] Caller has NON-URGENT question AND [2] triager unable to answer question    Protocols used: Colonoscopy Symptoms and Edthyunha-I-FU, Medication Question Call-A-AH

## 2024-09-02 ENCOUNTER — NURSE TRIAGE (OUTPATIENT)
Dept: ADMINISTRATIVE | Facility: CLINIC | Age: 29
End: 2024-09-02
Payer: MEDICAID

## 2024-09-02 NOTE — TELEPHONE ENCOUNTER
Pt reports she is to have a colonoscopy tomorrow at 11:30am, has to be at the hospital for 11am. Just realizing she was supposed to take the dulcolax tabs at noon, but did not, wanting to know if she should take them now and still start her first dose of prep at 6pm as scheduled. Pt also has a question about when she is supposed to stop consuming the clear liquids, pt states she was told to stop 4 hours before the procedure, but instructions state to stop at 8:30am which is not 4 hours prior to her 11:30 time. Call place to [place] [facility] Call MD, Dr. Gilman, who advised for pt to go ahead and take the dulcolax tabs now and to start prep at 6pm as planned and to stop all liquids by 7am tomorrow. Pt informed and Pt encouraged to call back with any worsening symptoms or questions. She verbalized understanding.    Reason for Disposition   [1] Caller has URGENT question or concern AND [2] triager unable to answer question    Additional Information   Negative: Shock suspected (e.g., cold/pale/clammy skin, too weak to stand, low BP, rapid pulse)   Negative: Difficult to awaken or acting confused (e.g., disoriented, slurred speech)   Negative: Passed out (e.g., fainted, lost consciousness, blacked out and was not responding)   Negative: Sounds like a life-threatening emergency to the triager   Negative: SEVERE abdomen pain (e.g., excruciating)   Negative: SEVERE rectal bleeding (large blood clots; on and off, or constant bleeding)   Negative: SEVERE dizziness (e.g., unable to stand, requires support to walk, feels like passing out now)   Negative: SEVERE vomiting (e.g., 6 or more times/day)   Negative: [1] MODERATE rectal bleeding (small blood clots, passing blood without stool, or toilet water turns red) AND [2] more than once   Negative: [1] MILD-MODERATE abdomen pain AND [2] constant AND [3] present > 2 hours   Negative: [1] Drinking very little AND [2] dehydration suspected (e.g., no urine > 12 hours, very dry mouth, very  lightheaded)   Negative: Patient sounds very sick or weak to the triager   Negative: Fever > 100.4 F (38.0 C)   Negative: [1] Abdominal bloating, cramping, nausea, or vomiting while drinking bowel prep AND [2] CANNOT finish bowel prep AND [3] has tried recommended Care Advice    Protocols used: Colonoscopy Symptoms and Acrronxdx-H-TV

## 2024-11-06 ENCOUNTER — PATIENT MESSAGE (OUTPATIENT)
Dept: HEMATOLOGY/ONCOLOGY | Facility: CLINIC | Age: 29
End: 2024-11-06
Payer: MEDICAID

## 2024-11-21 ENCOUNTER — TELEPHONE (OUTPATIENT)
Dept: HEMATOLOGY/ONCOLOGY | Facility: CLINIC | Age: 29
End: 2024-11-21
Payer: MEDICAID

## 2024-11-21 DIAGNOSIS — D50.9 IRON DEFICIENCY ANEMIA, UNSPECIFIED IRON DEFICIENCY ANEMIA TYPE: Primary | ICD-10-CM

## 2024-11-27 ENCOUNTER — PATIENT MESSAGE (OUTPATIENT)
Dept: RESEARCH | Facility: HOSPITAL | Age: 29
End: 2024-11-27
Payer: MEDICAID

## 2024-12-31 ENCOUNTER — TELEPHONE (OUTPATIENT)
Dept: HEMATOLOGY/ONCOLOGY | Facility: CLINIC | Age: 29
End: 2024-12-31
Payer: MEDICAID

## 2025-01-03 ENCOUNTER — TELEPHONE (OUTPATIENT)
Dept: HEMATOLOGY/ONCOLOGY | Facility: CLINIC | Age: 30
End: 2025-01-03
Payer: MEDICAID

## 2025-01-03 ENCOUNTER — LAB VISIT (OUTPATIENT)
Dept: LAB | Facility: HOSPITAL | Age: 30
End: 2025-01-03
Attending: PHYSICIAN ASSISTANT
Payer: MEDICAID

## 2025-01-03 DIAGNOSIS — D50.9 IRON DEFICIENCY ANEMIA, UNSPECIFIED IRON DEFICIENCY ANEMIA TYPE: ICD-10-CM

## 2025-01-03 LAB
ALBUMIN SERPL BCP-MCNC: 3.5 G/DL (ref 3.5–5.2)
ALP SERPL-CCNC: 57 U/L (ref 40–150)
ALT SERPL W/O P-5'-P-CCNC: 11 U/L (ref 10–44)
ANION GAP SERPL CALC-SCNC: 8 MMOL/L (ref 8–16)
AST SERPL-CCNC: 16 U/L (ref 10–40)
BASOPHILS # BLD AUTO: 0.01 K/UL (ref 0–0.2)
BASOPHILS NFR BLD: 0.3 % (ref 0–1.9)
BILIRUB SERPL-MCNC: 0.4 MG/DL (ref 0.1–1)
BUN SERPL-MCNC: 6 MG/DL (ref 6–20)
CALCIUM SERPL-MCNC: 9 MG/DL (ref 8.7–10.5)
CHLORIDE SERPL-SCNC: 107 MMOL/L (ref 95–110)
CO2 SERPL-SCNC: 23 MMOL/L (ref 23–29)
CREAT SERPL-MCNC: 0.8 MG/DL (ref 0.5–1.4)
DIFFERENTIAL METHOD BLD: ABNORMAL
EOSINOPHIL # BLD AUTO: 0 K/UL (ref 0–0.5)
EOSINOPHIL NFR BLD: 0.8 % (ref 0–8)
ERYTHROCYTE [DISTWIDTH] IN BLOOD BY AUTOMATED COUNT: 17.2 % (ref 11.5–14.5)
EST. GFR  (NO RACE VARIABLE): >60 ML/MIN/1.73 M^2
FERRITIN SERPL-MCNC: 18 NG/ML (ref 20–300)
GLUCOSE SERPL-MCNC: 78 MG/DL (ref 70–110)
HCT VFR BLD AUTO: 30.7 % (ref 37–48.5)
HGB BLD-MCNC: 9.1 G/DL (ref 12–16)
IMM GRANULOCYTES # BLD AUTO: 0.01 K/UL (ref 0–0.04)
IMM GRANULOCYTES NFR BLD AUTO: 0.3 % (ref 0–0.5)
IRON SERPL-MCNC: 32 UG/DL (ref 30–160)
LYMPHOCYTES # BLD AUTO: 1.8 K/UL (ref 1–4.8)
LYMPHOCYTES NFR BLD: 45.5 % (ref 18–48)
MCH RBC QN AUTO: 21.2 PG (ref 27–31)
MCHC RBC AUTO-ENTMCNC: 29.6 G/DL (ref 32–36)
MCV RBC AUTO: 72 FL (ref 82–98)
MONOCYTES # BLD AUTO: 0.4 K/UL (ref 0.3–1)
MONOCYTES NFR BLD: 10.7 % (ref 4–15)
NEUTROPHILS # BLD AUTO: 1.7 K/UL (ref 1.8–7.7)
NEUTROPHILS NFR BLD: 42.4 % (ref 38–73)
NRBC BLD-RTO: 0 /100 WBC
PLATELET # BLD AUTO: 390 K/UL (ref 150–450)
PMV BLD AUTO: 10.4 FL (ref 9.2–12.9)
POTASSIUM SERPL-SCNC: 4 MMOL/L (ref 3.5–5.1)
PROT SERPL-MCNC: 7.8 G/DL (ref 6–8.4)
RBC # BLD AUTO: 4.29 M/UL (ref 4–5.4)
SATURATED IRON: 8 % (ref 20–50)
SODIUM SERPL-SCNC: 138 MMOL/L (ref 136–145)
TOTAL IRON BINDING CAPACITY: 382 UG/DL (ref 250–450)
TRANSFERRIN SERPL-MCNC: 258 MG/DL (ref 200–375)
WBC # BLD AUTO: 3.91 K/UL (ref 3.9–12.7)

## 2025-01-03 PROCEDURE — 80053 COMPREHEN METABOLIC PANEL: CPT | Performed by: PHYSICIAN ASSISTANT

## 2025-01-03 PROCEDURE — 85025 COMPLETE CBC W/AUTO DIFF WBC: CPT | Performed by: PHYSICIAN ASSISTANT

## 2025-01-03 PROCEDURE — 82728 ASSAY OF FERRITIN: CPT | Performed by: PHYSICIAN ASSISTANT

## 2025-01-03 PROCEDURE — 84466 ASSAY OF TRANSFERRIN: CPT | Performed by: PHYSICIAN ASSISTANT

## 2025-01-03 PROCEDURE — 36415 COLL VENOUS BLD VENIPUNCTURE: CPT | Performed by: PHYSICIAN ASSISTANT

## 2025-01-03 NOTE — TELEPHONE ENCOUNTER
----- Message from Diandra sent at 1/3/2025  4:31 PM CST -----  Type:  Needs Medical Advice    Who Called: Patient  Best Call Back Number: [phone]  Additional Information: Patient is requesting a call back in regards to rescheduling 1/6 to any day other than Mon or Tues, preferably morning.

## 2025-01-07 ENCOUNTER — TELEPHONE (OUTPATIENT)
Dept: HEMATOLOGY/ONCOLOGY | Facility: CLINIC | Age: 30
End: 2025-01-07
Payer: MEDICAID

## 2025-01-07 NOTE — TELEPHONE ENCOUNTER
----- Message from Rae sent at 1/7/2025  9:50 AM CST -----  Type:  Patient Returning Call    Who Called: Pt   Does the patient know what this is regarding?: returning missed call   Would the patient rather a call back or a response via [employer] [facility]? Call   Best Call Back Number:[phone]   Additional Information:

## 2025-01-09 ENCOUNTER — TELEPHONE (OUTPATIENT)
Dept: HEMATOLOGY/ONCOLOGY | Facility: CLINIC | Age: 30
End: 2025-01-09
Payer: MEDICAID

## 2025-01-09 ENCOUNTER — OFFICE VISIT (OUTPATIENT)
Dept: HEMATOLOGY/ONCOLOGY | Facility: CLINIC | Age: 30
End: 2025-01-09
Payer: MEDICAID

## 2025-01-09 DIAGNOSIS — D50.9 MICROCYTIC ANEMIA: ICD-10-CM

## 2025-01-09 DIAGNOSIS — E66.813 CLASS 3 SEVERE OBESITY DUE TO EXCESS CALORIES WITH BODY MASS INDEX (BMI) OF 45.0 TO 49.9 IN ADULT, UNSPECIFIED WHETHER SERIOUS COMORBIDITY PRESENT: ICD-10-CM

## 2025-01-09 DIAGNOSIS — G47.00 FREQUENT NOCTURNAL AWAKENING: ICD-10-CM

## 2025-01-09 DIAGNOSIS — R06.83 SNORING: ICD-10-CM

## 2025-01-09 DIAGNOSIS — E66.01 CLASS 3 SEVERE OBESITY DUE TO EXCESS CALORIES WITH BODY MASS INDEX (BMI) OF 45.0 TO 49.9 IN ADULT, UNSPECIFIED WHETHER SERIOUS COMORBIDITY PRESENT: ICD-10-CM

## 2025-01-09 DIAGNOSIS — N92.1 MENORRHAGIA WITH IRREGULAR CYCLE: ICD-10-CM

## 2025-01-09 DIAGNOSIS — D50.0 IRON DEFICIENCY ANEMIA DUE TO CHRONIC BLOOD LOSS: Primary | ICD-10-CM

## 2025-01-09 DIAGNOSIS — I10 UNCONTROLLED HYPERTENSION: ICD-10-CM

## 2025-01-09 NOTE — PROGRESS NOTES
Hematology/Oncology Virtual Visit Note    The patient location is: home  The chief complaint leading to consultation is: iron deficiency anemia    Visit type: audiovisual    Face to Face time with patient: 17  33 minutes of total time spent on the encounter, which includes face to face time and non-face to face time preparing to see the patient (eg, review of tests), Obtaining and/or reviewing separately obtained history, Documenting clinical information in the electronic or other health record, Independently interpreting results (not separately reported) and communicating results to the patient/family/caregiver, or Care coordination (not separately reported).     Each patient to whom he or she provides medical services by telemedicine is:  (1) informed of the relationship between the physician and patient and the respective role of any other health care provider with respect to management of the patient; and (2) notified that he or she may decline to receive medical services by telemedicine and may withdraw from such care at any time.      Patient ID: Mariana Hardy is a 29 y.o. female.    Chief Complaint: iron deficiency anemia    History     Ms. Hardy is a 29 y.o. female referred to hematology for the evaluation of iron deficiency anemia 2/2 menorrhagia. Her PMHx is significant for hypertension and obesity. She is prescribed HTN medication, but does not take it consistently. She reports following with PCP through  family clinic located on [facility] and [facility], but does not recall the provider she sees.     She was seen in the ED for abdominal pain and rectal bleeding, CTA was negative. She subsequently followed up with GI, a colonoscopy was done that was also negative. Labs were done at this time indicating a microscopic anemia 2/2 iron deficiency. Mariana reports heavy menstrual cycles since menarche, but worsened post partum. She has irregular cycles lasting for 4-6 days, she passes many clots and  uses about 6 superpads per day. GI provider prescribed ferrous sulfate BID, but she was unable to tolerate this due to GI side effects of abdominal pain, constipation, and vomiting. Pt also reports trouble with sleep. She has no trouble falling asleep, but has trouble staying asleep and will only get about 4-5 hours of sleep per night.     Shatoria reports incorporating more leafy green vegetables in her diet. She eats meat and protein-rich foods.     Pt denies any recurrent fevers/infections, worsening fatigue, bruising, lightheadedness, dizziness, SOB, frequent chest pain, pallor, decreased appetite/activity, current abdominal cramping, vomiting, blood in stool, or blood in urine.     Cycle history - heavy cycles since menarche, but got worse postpartum (2 kids), every month, but irregular (not consistent - sometimes at beginning or at the end); 4-6 days; uses about 6 superpads does have clots larger than quarter size     Fam hx: mother with heavy menstrual cycles and h/o fibroids     Review of patient's allergies indicates:  No Known Allergies    Past medical, surgical, and medication history, past family history reviewed and updated with patient today as noted.      No past medical history on file.    Past Surgical History:   Procedure Laterality Date    COLONOSCOPY N/A 9/3/2024    Procedure: COLONOSCOPY;  Surgeon: Fredi Haider MD;  Location: [facility];  Service: Endoscopy;  Laterality: N/A;       Review of Systems:  Review of Systems   Constitutional:  Negative for fever and malaise/fatigue.   HENT:  Negative for nosebleeds.    Respiratory:  Negative for shortness of breath.    Cardiovascular:  Positive for chest pain (1-2x per month). Negative for leg swelling.   Gastrointestinal:  Negative for abdominal pain and blood in stool.   Genitourinary:  Negative for hematuria.        Menorrhagia   Musculoskeletal:  Negative for myalgias.   Skin:  Negative for rash.   Neurological:  Negative for dizziness  and headaches.   Endo/Heme/Allergies:  Does not bruise/bleed easily.        Physical Exam   Vitals:  Virtual Visit     Labs:  No visits with results within 2 Day(s) from this visit.   Latest known visit with results is:   Lab Visit on 01/03/2025   Component Date Value Ref Range Status    WBC 01/03/2025 3.91  3.90 - 12.70 K/uL Final    RBC 01/03/2025 4.29  4.00 - 5.40 M/uL Final    Hemoglobin 01/03/2025 9.1 (L)  12.0 - 16.0 g/dL Final    Hematocrit 01/03/2025 30.7 (L)  37.0 - 48.5 % Final    MCV 01/03/2025 72 (L)  82 - 98 fL Final    MCH 01/03/2025 21.2 (L)  27.0 - 31.0 pg Final    MCHC 01/03/2025 29.6 (L)  32.0 - 36.0 g/dL Final    RDW 01/03/2025 17.2 (H)  11.5 - 14.5 % Final    Platelets 01/03/2025 390  150 - 450 K/uL Final    MPV 01/03/2025 10.4  9.2 - 12.9 fL Final    Immature Granulocytes 01/03/2025 0.3  0.0 - 0.5 % Final    Gran # (ANC) 01/03/2025 1.7 (L)  1.8 - 7.7 K/uL Final    Immature Grans (Abs) 01/03/2025 0.01  0.00 - 0.04 K/uL Final    Comment: Mild elevation in immature granulocytes is non specific and   can be seen in a variety of conditions including stress response,   acute inflammation, trauma and pregnancy. Correlation with other   laboratory and clinical findings is essential.      Lymph # 01/03/2025 1.8  1.0 - 4.8 K/uL Final    Mono # 01/03/2025 0.4  0.3 - 1.0 K/uL Final    Eos # 01/03/2025 0.0  0.0 - 0.5 K/uL Final    Baso # 01/03/2025 0.01  0.00 - 0.20 K/uL Final    nRBC 01/03/2025 0  0 /100 WBC Final    Gran % 01/03/2025 42.4  38.0 - 73.0 % Final    Lymph % 01/03/2025 45.5  18.0 - 48.0 % Final    Mono % 01/03/2025 10.7  4.0 - 15.0 % Final    Eosinophil % 01/03/2025 0.8  0.0 - 8.0 % Final    Basophil % 01/03/2025 0.3  0.0 - 1.9 % Final    Differential Method 01/03/2025 Automated   Final    Sodium 01/03/2025 138  136 - 145 mmol/L Final    Potassium 01/03/2025 4.0  3.5 - 5.1 mmol/L Final    Chloride 01/03/2025 107  95 - 110 mmol/L Final    CO2 01/03/2025 23  23 - 29 mmol/L Final    Glucose  01/03/2025 78  70 - 110 mg/dL Final    BUN 01/03/2025 6  6 - 20 mg/dL Final    Creatinine 01/03/2025 0.8  0.5 - 1.4 mg/dL Final    Calcium 01/03/2025 9.0  8.7 - 10.5 mg/dL Final    Total Protein 01/03/2025 7.8  6.0 - 8.4 g/dL Final    Albumin 01/03/2025 3.5  3.5 - 5.2 g/dL Final    Total Bilirubin 01/03/2025 0.4  0.1 - 1.0 mg/dL Final    Comment: For infants and newborns, interpretation of results should be based  on gestational age, weight and in agreement with clinical  observations.    Premature Infant recommended reference ranges:  Up to 24 hours.............<8.0 mg/dL  Up to 48 hours............<12.0 mg/dL  3-5 days..................<15.0 mg/dL  6-29 days.................<15.0 mg/dL      Alkaline Phosphatase 01/03/2025 57  40 - 150 U/L Final    AST 01/03/2025 16  10 - 40 U/L Final    ALT 01/03/2025 11  10 - 44 U/L Final    eGFR 01/03/2025 >60  >60 mL/min/1.73 m^2 Final    Anion Gap 01/03/2025 8  8 - 16 mmol/L Final    Iron 01/03/2025 32  30 - 160 ug/dL Final    Transferrin 01/03/2025 258  200 - 375 mg/dL Final    TIBC 01/03/2025 382  250 - 450 ug/dL Final    Saturated Iron 01/03/2025 8 (L)  20 - 50 % Final    Ferritin 01/03/2025 18 (L)  20.0 - 300.0 ng/mL Final        Physical Exam:  Deferred due to virtual visit     ECOG:   ECOG SCORE    0 - Fully active-able to carry on all pre-disease performance without restriction        PROCEDURES/IMAGING  No new imaging today      Discussion     Problem List:  Problem List Items Addressed This Visit       Class 3 severe obesity due to excess calories with body mass index (BMI) of 45.0 to 49.9 in adult    Microcytic anemia    Iron deficiency anemia due to chronic blood loss - Primary    Relevant Orders    CBC Auto Differential    Iron and TIBC    Ferritin    Uncontrolled hypertension    Menorrhagia with irregular cycle    Relevant Orders    CBC Auto Differential    Iron and TIBC    Ferritin     Other Visit Diagnoses       Frequent nocturnal awakening        Relevant  Orders    Ambulatory referral/consult to Sleep Disorders    Snoring        Relevant Orders    Ambulatory referral/consult to Sleep Disorders             Iron deficiency Anemia  - Likely 2/2 heavy menstrual cycles   - Most recent labs with improvement in serum iron, but still low ferritin and anemic.   - Iron rich foods and vit C rich foods reviewed with patient  - Will benefit from IV iron infusions, but does not want to proceed with this at this point since she is not having symptoms  - I recommended that she take PO iron supplementation every other day as it is best absorbed this way. Do NOT take with dairy containing products or within 2 hours of taking a PPI. Iron store replenishment with PO supplementation can take at least 3 months with consistent administration.   - RTC in 3 months with labs prior     Menorrhagia   - Likely cause of iron deficiency  - Has OBGYN appt in February.   - We briefly discussed options for hormonal control that she can further discuss with her GYN.   - Also introduced Lysteda if she does not wish to use hormonal control for her cycles.   - Mother with history of fibroids     Difficulty with sleep  - Per patient she also snores loudly and has trouble staying asleep. Patient also has class 3 obesity  - Concern for sleep apnea.   - Referral for sleep medicine placed     Obesity  - Reviewed iron rich foods, but also need for healthier diet and exercise  - Continue care with PCP    HTN  - Last documented BP is 155/80  - Reports that she has been prescribed Lisinopril, but she is not taking it.   - Continue care and management with PCP    Sindy Hammond PA-C  1/9/2025 2:42 PM   Hematology/Oncology  [facility] - [address], [address]  [place] [address]  Phone: [phone]  Fax: [phone]